# Patient Record
Sex: MALE | Race: WHITE | URBAN - METROPOLITAN AREA
[De-identification: names, ages, dates, MRNs, and addresses within clinical notes are randomized per-mention and may not be internally consistent; named-entity substitution may affect disease eponyms.]

---

## 2017-06-16 ENCOUNTER — HOSPITAL ENCOUNTER (OUTPATIENT)
Dept: INTENSIVE CARE | Facility: HOSPITAL | Age: 62
End: 2017-06-17
Attending: INTERNAL MEDICINE | Admitting: INTERNAL MEDICINE

## 2017-06-16 LAB
ABS NEUT (OLG): 3.62 X10(3)/MCL (ref 2.1–9.2)
ALBUMIN SERPL-MCNC: 4 GM/DL (ref 3.4–5)
ALBUMIN/GLOB SERPL: 1.3 RATIO (ref 1.1–2)
ALP SERPL-CCNC: 46 UNIT/L (ref 50–136)
ALT SERPL-CCNC: 37 UNIT/L (ref 12–78)
AST SERPL-CCNC: 18 UNIT/L (ref 15–37)
BASOPHILS # BLD AUTO: 0 X10(3)/MCL (ref 0–0.2)
BASOPHILS NFR BLD AUTO: 1 %
BILIRUB SERPL-MCNC: 0.3 MG/DL (ref 0.2–1)
BILIRUBIN DIRECT+TOT PNL SERPL-MCNC: 0.1 MG/DL (ref 0–0.5)
BILIRUBIN DIRECT+TOT PNL SERPL-MCNC: 0.2 MG/DL (ref 0–0.8)
BUN SERPL-MCNC: 18 MG/DL (ref 7–18)
CALCIUM SERPL-MCNC: 8.3 MG/DL (ref 8.5–10.1)
CHLORIDE SERPL-SCNC: 108 MMOL/L (ref 98–107)
CO2 SERPL-SCNC: 22 MMOL/L (ref 21–32)
CREAT SERPL-MCNC: 1.35 MG/DL (ref 0.7–1.3)
EOSINOPHIL # BLD AUTO: 0.1 X10(3)/MCL (ref 0–0.9)
EOSINOPHIL NFR BLD AUTO: 2 %
ERYTHROCYTE [DISTWIDTH] IN BLOOD BY AUTOMATED COUNT: 14 % (ref 11.5–17)
GLOBULIN SER-MCNC: 3 GM/DL (ref 2.4–3.5)
GLUCOSE SERPL-MCNC: 141 MG/DL (ref 74–106)
HCT VFR BLD AUTO: 40.9 % (ref 42–52)
HGB BLD-MCNC: 13.4 GM/DL (ref 14–18)
LYMPHOCYTES # BLD AUTO: 1.3 X10(3)/MCL (ref 0.6–4.6)
LYMPHOCYTES NFR BLD AUTO: 23 %
MCH RBC QN AUTO: 27.2 PG (ref 27–31)
MCHC RBC AUTO-ENTMCNC: 32.8 GM/DL (ref 33–36)
MCV RBC AUTO: 83.1 FL (ref 80–94)
MONOCYTES # BLD AUTO: 0.5 X10(3)/MCL (ref 0.1–1.3)
MONOCYTES NFR BLD AUTO: 9 %
NEUTROPHILS # BLD AUTO: 3.62 X10(3)/MCL (ref 2.1–9.2)
NEUTROPHILS NFR BLD AUTO: 64 %
PLATELET # BLD AUTO: 253 X10(3)/MCL (ref 130–400)
PMV BLD AUTO: 9.6 FL (ref 9.4–12.4)
POC TROPONIN: 0 NG/ML (ref 0–0.08)
POTASSIUM SERPL-SCNC: 3.6 MMOL/L (ref 3.5–5.1)
PROT SERPL-MCNC: 7 GM/DL (ref 6.4–8.2)
RBC # BLD AUTO: 4.92 X10(6)/MCL (ref 4.7–6.1)
SODIUM SERPL-SCNC: 141 MMOL/L (ref 136–145)
WBC # SPEC AUTO: 5.6 X10(3)/MCL (ref 4.5–11.5)

## 2017-06-17 LAB
APPEARANCE, UA: CLEAR
BACTERIA SPEC CULT: ABNORMAL /HPF
BILIRUB UR QL STRIP: NEGATIVE
COLOR UR: YELLOW
GLUCOSE (UA): NEGATIVE
HGB UR QL STRIP: NEGATIVE
KETONES UR QL STRIP: ABNORMAL
LEUKOCYTE ESTERASE UR QL STRIP: NEGATIVE
NITRITE UR QL STRIP: NEGATIVE
PH UR STRIP: 6 [PH] (ref 5–9)
PROT UR QL STRIP: NEGATIVE
RBC #/AREA URNS HPF: ABNORMAL /[HPF]
SP GR UR STRIP: 1.03 (ref 1–1.03)
SQUAMOUS EPITHELIAL, UA: ABNORMAL
TROPONIN I SERPL-MCNC: <0.02 NG/ML (ref 0.02–0.49)
TROPONIN I SERPL-MCNC: <0.02 NG/ML (ref 0.02–0.49)
UROBILINOGEN UR STRIP-ACNC: 1
WBC #/AREA URNS HPF: ABNORMAL /[HPF]

## 2022-04-28 NOTE — DISCHARGE SUMMARY
DISCHARGE DATE:  06/17/2017    DISCHARGE DIAGNOSIS:  Chest pain.  Unstable angina.    HOSPITAL COURSE:  The patient is a 61-year-old  male with history of hyperlipidemia who was admitted to the emergency room on 6/16/17 with complaint of chest pain that started four hours prior to presentation.  Chest pain eventually resolved in the emergency room after taking nitroglycerin.  The patient's cardiac enzymes has been negative x three sets and as he had no further chest pain after admission the patient was therefore discharged.  Condition on discharge:  Stable.  Prognosis:  Good.  Medication:  Continue nitroglycerin 81 PO daily and Atorvastatin 10 mg PO q h.s., fenofibrate 120 mg PO daily and also nitroglycerin PRN.  Disposition:  Patient will follow up in my office in one week for further evaluation with perfusion study.  Condition on discharge:  Stable.  Prognosis:  good.        ______________________________  Y. MD ANAND Curtis/RAISA  DD:  06/17/2017  Time:  09:05AM  DT:  06/19/2017  Time:  04:20PM  Job #:  [id number]

## 2022-04-28 NOTE — H&P
Patient:   Killian Merino            MRN: [id number]            FIN: [id number]               Age:   61 years     Sex:  Male     :  1955   Associated Diagnoses:   Chest pain; Angina pectoris, unstable; ACS (acute coronary syndrome)   Author:   CYN Welch MD      Basic Information   61 rina old with history of hyperlipidemia, presented to ER with c/o left sided substernal chest pain started about 5 hours prior to presentation, with pain radiated to left arm. He took excedrin with some relief of pain, but still with mild chest discomfort on presentation, and was totally relieved by NTG in ER.       Review of Systems   Constitutional:  Negative.    Eye:  Negative.    Ear/Nose/Mouth/Throat:  Negative.    Respiratory:  Negative.    Cardiovascular:       Chest pain: Left sided.    Gastrointestinal:  Negative.    Genitourinary:  Negative.    Hematology/Lymphatics:  Negative.    Endocrine:  Negative.    Integumentary:  Negative.    Neurologic:  Negative.       Health Status   Allergies:    Allergic Reactions (Selected)  No Known Allergies,    Allergies (1) Active Reaction  No Known Allergies None Documented     Current medications:  (Selected)   Inpatient Medications  Ordered  aspirin 325 mg oral tablet: 325 mg, form: Tab, Oral, Daily, first dose 17 22:10:00 CDT, 4 chew tab = 5 grains, 23  aspirin: 324 mg, form: Tab-Chew, Oral, Daily, first dose 17 9:00:00 CDT, 23  nitroglycerin: 0.4 mg, form: Tab-SL, SL, q5min PRN for chest pain, Order duration: 3 dose(s), first dose 17 2:32:00 CDT, stop date Limited # of times, Hold if SBP < 100, 30,025  Prescriptions  Prescribed  Flomax 0.4 mg oral capsule: 0.4 mg = 1 cap(s), Oral, Daily, # 10 cap(s), 0 Refill(s)  Documented Medications  Documented  Pantoprazole 40 mg ORAL EC-Tablet: 40 mg = 1 tab(s), Oral, Daily, # 30 tab(s), 0 Refill(s)  atorvastatin 10 mg oral tablet: 10 mg = 1 tab(s), Oral, Daily, # 30 tab(s), 0 Refill(s)  fenofibrate 120 mg oral  tablet: 120 mg = 1 tab(s), Oral, Daily, # 30 tab(s), 0 Refill(s),    Medications (3) Active  Scheduled: (2)  aspirin 325 mg Tab  325 mg 1 tab(s), Oral, Daily  aspirin 81 mg Chew tab  324 mg 4 tab(s), Oral, Daily  Continuous: (0)  PRN: (1)  Nitroglycerin 0.4 mg TAB (per 25's)  0.4 mg 1 tab(s), SL, q5min     Problem list:    No qualifying data available        Physical Examination   Vital Signs   6/16/2017 23:45 CDT      Systolic Blood Pressure   113 mmHg                             Diastolic Blood Pressure  68 mmHg                             Mean Arterial Pressure, Cuff              83 mmHg    6/16/2017 23:20 CDT      Oxygen Therapy            Room air    6/16/2017 22:01 CDT      Temperature Oral          36.8 DegC                             Peripheral Pulse Rate     84 bpm                             Respiratory Rate          18 br/min                             SpO2                      100 %                             Systolic Blood Pressure   133 mmHg                             Diastolic Blood Pressure  86 mmHg     Measurements from flowsheet : Measurements   6/16/2017 22:01 CDT      Weight Estimated          108 kg                             Height/Length Estimated   182 cm                             Body Mass Index Estimated 32.6 kg/m2     General:  Alert and oriented.    Eye:  Pupils are equal, round and reactive to light.    HENT:  Normocephalic.    Neck:  Supple.    Respiratory:  Lungs are clear to auscultation.    Cardiovascular:  Normal rate.    Gastrointestinal:  Soft.    Genitourinary:  No costovertebral angle tenderness.    Musculoskeletal:  Normal range of motion.    Integumentary:  Warm.       Review / Management   Results review:     Labs (Last four charted values)  Glucose              H 141 (JUN 16) , All Results   6/16/2017 22:23 CDT      POC Troponin              0.00 ng/mL    6/16/2017 22:20 CDT      WBC                       5.6 x10(3)/mcL                             RBC                        4.92 x10(6)/mcL                             Hgb                       13.4 gm/dL  LOW                             Hct                       40.9 %  LOW                             Platelet                  253 x10(3)/mcL                             MCV                       83.1 fL                             MCH                       27.2 pg                             MCHC                      32.8 gm/dL  LOW                             RDW                       14.0 %                             MPV                       9.6 fL                             Abs Neut                  3.62 x10(3)/mcL                             Neutro Auto               64 %  NA                             Lymph Auto                23 %  NA                             Mono Auto                 9 %  NA                             Eos Auto                  2 %  NA                             Abs Eos                   0.1 x10(3)/mcL                             Basophil Auto             1 %  NA                             Abs Neutro                3.62 x10(3)/mcL                             Abs Lymph                 1.3 x10(3)/mcL                             Abs Mono                  0.5 x10(3)/mcL                             Abs Baso                  0.0 x10(3)/mcL                             Sodium Lvl                141 mmol/L                             Potassium Lvl             3.6 mmol/L                             Chloride                  108 mmol/L  HI                             CO2                       22.0 mmol/L                             Calcium Lvl               8.3 mg/dL  LOW                             Glucose Lvl               141 mg/dL  HI                             BUN                       18.0 mg/dL                             Creatinine                1.35 mg/dL  HI                             eGFR-AA                   >60 mL/min/1.73 m2  NA                             eGFR-SEBASTIAN                  57  mL/min/1.73 m2  NA                             Bili Total                0.3 mg/dL                             Bili Direct               0.10 mg/dL                             Bili Indirect             0.20 mg/dL                             AST                       18 unit/L                             ALT                       37 unit/L                             Alk Phos                  46 unit/L  LOW                             Total Protein             7.0 gm/dL                             Albumin Lvl               4.00 gm/dL                             Globulin                  3.00 gm/dL                             A/G Ratio                 1.3 ratio                             Forearm Over the needle Right 20 gauge                                   Peripheral IV Activity:               Start                                 Peripheral IV Number of Attempts:     1                                 Peripheral IV Site Condition:         No complications                                 Peripheral IV Drainage Description:   None                                 Peripheral IV Care:   Secured with tape                                 Peripheral IV Dressing:               Dry, Intact, Transparent                                 Peripheral IV Patency:                No complications                                 Peripheral IV Equipment:              Extension set  .       Impression and Plan   Diagnosis     Chest pain (PNED [id number]).     Angina pectoris, unstable ([id number] I20.0).     ACS (acute coronary syndrome) (QOH55-FR I24.9).     Course:  Progressing as expected, Improving: none.    Orders     admit to telemitry, serial cardiac enzymes to rule out MI. .     Education and Follow-up:       Counseled: Patient, Regarding diagnosis, Regarding treatment.

## 2022-04-28 NOTE — ED PROVIDER NOTES
"   Patient:   Killian Merino            MRN: [id number]            FIN: [id number]               Age:   61 years     Sex:  Male     :  1955   Associated Diagnoses:   ACS (acute coronary syndrome)   Author:   Janiya NG MD, Jp FERGUSON      Basic Information   Time seen: Date & time 2017 22:04:00.   History source: Patient.   Arrival mode: Private vehicle, walking.   History limitation: None.   Additional information: Chief Complaint from Nursing Triage Note : Chief Complaint   2017 22:01 CDT      Chief Complaint           Chest pain x 3 hours that radiates down left arm.  .      History of Present Illness   The patient presents with chest pain, 60 YO WM with no hx of CAD. Onset of left anterior/lateral dull chest pain radiating into left upper arm for 3 hours, some relief with excedrin. No SOB, Nausea. No family hx CAD. Carli Booth, Jazmín SORT NOTE and       I, Dr. Ledbetter, assumed care of this patient upon entering the room at 2315.  61 year old white male presents to the ED due to left anterior chest pressure that radiates into his left arm onset at 1845 this evening. Pt did take 250 of Excedrin, which helped the pain. Pt that the pain lasted for about 5 hours, now he just has a "funny feeling".  Pt denies SOB, n/v/d, abdominal pain, leg pain or swelling. Pt has no cardiac history, and denies feeling tired fatigued or weak as of lately. .  The onset was 2017 18:45:00 .  The course/duration of symptoms is episodic: with a single episode and lasting 5  hour(s).  Location: Left anterior chest. Radiating pain: left arm. The character of symptoms is pressure.  The degree at onset was moderate.  The degree at maximum was moderate.  The degree at present is minimal.  The exacerbating factor is none.  The relieving factor is Excedrin.  Risk factors consist of none.  Prior episodes: none.  Therapy today 250 of Excedrin.  Associated symptoms: none.        Review of Systems   Constitutional symptoms: "  No fever, no chills, no sweats, no weakness.    Skin symptoms:  No rash,    Eye symptoms:  No recent vision problems,    ENMT symptoms:  No ear pain,    Respiratory symptoms:  No shortness of breath, no orthopnea.    Cardiovascular symptoms:  Chest pain, left chest, anterior, radiating to the left arm, No palpitations,    Gastrointestinal symptoms:  No abdominal pain, no nausea, no vomiting, no diarrhea.    Genitourinary symptoms:  No dysuria, no hematuria.    Musculoskeletal symptoms:  no leg pain or swelling, no back pain, no Muscle pain.    Psychiatric symptoms:  No anxiety, no depression.    Allergy/immunologic symptoms:  No seasonal allergies, no food allergies.              Additional review of systems information: All other systems reviewed and otherwise negative, All systems reviewed as documented in chart.      Health Status   Allergies:    Allergic Reactions (Selected)  No Known Allergies.   Medications:  (Selected)   Prescriptions  Prescribed  Flomax 0.4 mg oral capsule: 0.4 mg = 1 cap(s), Oral, Daily, # 10 cap(s), 0 Refill(s).      Past Medical/ Family/ Social History   Medical history:    Resolved  GERD (gastroesophageal reflux disease) (530.81):  Resolved.  Hyperlipemia (272.4):  Resolved..   Family history: Mitral valve replacement (father).   Social history: Alcohol use, Tobacco use: Denies.   Surgical history.   Physical Examination               Vital Signs   Vital Signs   6/16/2017 22:01 CDT      Temperature Oral          36.8 DegC                             Peripheral Pulse Rate     84 bpm                             Respiratory Rate          18 br/min                             SpO2                      100 %                             Systolic Blood Pressure   133 mmHg                             Diastolic Blood Pressure  86 mmHg  .               Per nurse's notes.   Measurements   6/16/2017 22:01 CDT      Weight Estimated          108 kg                             Height/Length Estimated    182 cm                             Body Mass Index Estimated 32.6 kg/m2  .   Basic Oxygen Information   6/16/2017 22:01 CDT      SpO2                      100 %  .   General:  Alert, no acute distress.    Skin:  Warm, pink, intact, moist, no rash.    Head:  Normocephalic, atraumatic.    Cardiovascular:  Regular rate and rhythm, No murmur, Normal peripheral perfusion, No edema.    Respiratory:  Lungs are clear to auscultation, respirations are non-labored, breath sounds are equal, Symmetrical chest wall expansion.    Gastrointestinal:  Soft, Non distended, Normal bowel sounds.    Musculoskeletal:  Normal ROM, normal strength.    Neurological:  Alert and oriented to person, place, time, and situation, No focal neurological deficit observed, CN II-XII intact, normal sensory observed, normal motor observed, normal speech observed.    Lymphatics:  No lymphadenopathy.   Psychiatric:  Cooperative, appropriate mood & affect, normal judgment.       Medical Decision Making   Documents reviewed:  Emergency department nurses' notes.   Electrocardiogram:  Time 6/16/2017 21:57:00, rate 79, normal sinus rhythm, No ST-T changes, no ectopy, normal OH & QRS intervals, EP Interp.    Results review:  Lab results : Lab View   6/16/2017 22:23 CDT      POC Troponin              0.00 ng/mL    6/16/2017 22:20 CDT      Sodium Lvl                141 mmol/L                             Potassium Lvl             3.6 mmol/L                             Chloride                  108 mmol/L  HI                             CO2                       22.0 mmol/L                             Calcium Lvl               8.3 mg/dL  LOW                             Glucose Lvl               141 mg/dL  HI                             BUN                       18.0 mg/dL                             Creatinine                1.35 mg/dL  HI                             eGFR-AA                   >60 mL/min/1.73 m2  NA                             eGFR-SEBASTIAN                   57 mL/min/1.73 m2  NA                             Bili Total                0.3 mg/dL                             Bili Direct               0.10 mg/dL                             Bili Indirect             0.20 mg/dL                             AST                       18 unit/L                             ALT                       37 unit/L                             Alk Phos                  46 unit/L  LOW                             Total Protein             7.0 gm/dL                             Albumin Lvl               4.00 gm/dL                             Globulin                  3.00 gm/dL                             A/G Ratio                 1.3 ratio                             WBC                       5.6 x10(3)/mcL                             RBC                       4.92 x10(6)/mcL                             Hgb                       13.4 gm/dL  LOW                             Hct                       40.9 %  LOW                             Platelet                  253 x10(3)/mcL                             MCV                       83.1 fL                             MCH                       27.2 pg                             MCHC                      32.8 gm/dL  LOW                             RDW                       14.0 %                             MPV                       9.6 fL                             Abs Neut                  3.62 x10(3)/mcL                             Neutro Auto               64 %  NA                             Lymph Auto                23 %  NA                             Mono Auto                 9 %  NA                             Eos Auto                  2 %  NA                             Abs Eos                   0.1 x10(3)/mcL                             Basophil Auto             1 %  NA                             Abs Neutro                3.62 x10(3)/mcL                             Abs Lymph                 1.3 x10(3)/mcL                              Abs Mono                  0.5 x10(3)/mcL                             Abs Baso                  0.0 x10(3)/mcL  .      Reexamination/ Reevaluation   Time: 6/17/2017 01:29:00 .   Assessment: Patient 2 out of 10 pain on arrival patient given nitro with complete resolution discussed case with Dr. Dr. Santosh Welch will admit.      Impression and Plan   Diagnosis   ACS (acute coronary syndrome) (DQZ56-GM I24.9)      Calls-Consults   -  amparo-[email or web address] aadmit.   Plan   Condition: Improved, Stable.    Disposition: Admit time  6/17/2017 01:30:00.    Counseled: Patient, Family, Regarding diagnosis, Regarding diagnostic results, Regarding treatment plan, Regarding prescription, Patient indicated understanding of instructions.    Notes: I, Mynor Vieira, acted solely as a scribe for and in the presence of Dr. Denson who performed the service.,       This scribes note accurately reflects the work done by me I have reviewed the note and personally performed a history and physical and agree with all the documentation and findings.